# Patient Record
Sex: FEMALE | Race: WHITE | NOT HISPANIC OR LATINO | Employment: UNEMPLOYED | URBAN - NONMETROPOLITAN AREA
[De-identification: names, ages, dates, MRNs, and addresses within clinical notes are randomized per-mention and may not be internally consistent; named-entity substitution may affect disease eponyms.]

---
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
## 2019-03-27 ENCOUNTER — TRANSCRIBE ORDERS (OUTPATIENT)
Dept: ADMINISTRATIVE | Facility: HOSPITAL | Age: 19
End: 2019-03-27

## 2019-03-27 DIAGNOSIS — R10.32 ABDOMINAL PAIN, LEFT LOWER QUADRANT: Primary | ICD-10-CM

## 2019-03-29 ENCOUNTER — HOSPITAL ENCOUNTER (OUTPATIENT)
Dept: CT IMAGING | Facility: HOSPITAL | Age: 19
Discharge: HOME | End: 2019-03-29

## 2019-03-29 DIAGNOSIS — R10.32 ABDOMINAL PAIN, LEFT LOWER QUADRANT: ICD-10-CM

## 2019-04-01 ENCOUNTER — TRANSCRIBE ORDERS (OUTPATIENT)
Dept: ADMINISTRATIVE | Facility: HOSPITAL | Age: 19
End: 2019-04-01

## 2019-04-01 DIAGNOSIS — R10.12 ABDOMINAL PAIN, LEFT UPPER QUADRANT: ICD-10-CM

## 2019-04-01 DIAGNOSIS — R10.32 LEFT LOWER QUADRANT PAIN: Primary | ICD-10-CM

## 2019-04-02 ENCOUNTER — APPOINTMENT (OUTPATIENT)
Dept: CT IMAGING | Facility: HOSPITAL | Age: 19
End: 2019-04-02

## 2019-04-02 ENCOUNTER — HOSPITAL ENCOUNTER (OUTPATIENT)
Dept: CT IMAGING | Facility: HOSPITAL | Age: 19
Discharge: HOME | End: 2019-04-02
Admitting: SURGERY

## 2019-04-02 DIAGNOSIS — R10.12 ABDOMINAL PAIN, LEFT UPPER QUADRANT: ICD-10-CM

## 2019-04-02 DIAGNOSIS — R10.32 LEFT LOWER QUADRANT PAIN: ICD-10-CM

## 2019-04-02 PROCEDURE — 74176 CT ABD & PELVIS W/O CONTRAST: CPT

## 2019-04-02 PROCEDURE — 0 DIATRIZOATE MEGLUMINE & SODIUM PER 1 ML: Performed by: SURGERY

## 2019-04-02 RX ADMIN — DIATRIZOATE MEGLUMINE AND DIATRIZOATE SODIUM 30 ML: 660; 100 LIQUID ORAL; RECTAL at 15:59

## 2020-08-31 ENCOUNTER — HOSPITAL ENCOUNTER (OUTPATIENT)
Dept: ULTRASOUND IMAGING | Facility: HOSPITAL | Age: 20
Discharge: HOME | End: 2020-08-31
Admitting: OBSTETRICS & GYNECOLOGY

## 2020-08-31 DIAGNOSIS — N63.0 LUMP OR MASS IN BREAST: ICD-10-CM

## 2020-08-31 PROCEDURE — 76642 ULTRASOUND BREAST LIMITED: CPT

## 2021-08-11 ENCOUNTER — OFFICE VISIT (OUTPATIENT)
Dept: OTOLARYNGOLOGY | Facility: CLINIC | Age: 21
End: 2021-08-11

## 2021-08-11 VITALS
HEIGHT: 65 IN | BODY MASS INDEX: 35.06 KG/M2 | DIASTOLIC BLOOD PRESSURE: 89 MMHG | SYSTOLIC BLOOD PRESSURE: 133 MMHG | WEIGHT: 210.4 LBS

## 2021-08-11 DIAGNOSIS — J32.9 SINUSITIS, UNSPECIFIED CHRONICITY, UNSPECIFIED LOCATION: ICD-10-CM

## 2021-08-11 DIAGNOSIS — H69.83 DYSFUNCTION OF BOTH EUSTACHIAN TUBES: Primary | ICD-10-CM

## 2021-08-11 DIAGNOSIS — J30.9 ALLERGIC RHINITIS, UNSPECIFIED SEASONALITY, UNSPECIFIED TRIGGER: ICD-10-CM

## 2021-08-11 DIAGNOSIS — J34.2 NASAL SEPTAL DEVIATION: ICD-10-CM

## 2021-08-11 PROBLEM — H69.93 DYSFUNCTION OF BOTH EUSTACHIAN TUBES: Status: ACTIVE | Noted: 2021-08-11

## 2021-08-11 PROCEDURE — 31231 NASAL ENDOSCOPY DX: CPT | Performed by: OTOLARYNGOLOGY

## 2021-08-11 PROCEDURE — 99203 OFFICE O/P NEW LOW 30 MIN: CPT | Performed by: OTOLARYNGOLOGY

## 2021-08-11 RX ORDER — CLINDAMYCIN HYDROCHLORIDE 150 MG/1
100 CAPSULE ORAL 2 TIMES DAILY
COMMUNITY
End: 2022-05-02

## 2021-08-11 RX ORDER — CLINDAMYCIN PHOSPHATE 10 MG/G
GEL TOPICAL
COMMUNITY
Start: 2021-07-26 | End: 2022-05-02

## 2021-08-11 RX ORDER — BENZOYL PEROXIDE 100 MG/ML
LIQUID TOPICAL
COMMUNITY
Start: 2021-07-26 | End: 2022-05-02

## 2021-08-11 NOTE — PROGRESS NOTES
ENT Office Consult Note     Date of Consult: 2021     Patient Name: Marissa Skelton  MRN: [id number]   : 2000     Referring Provider: Ayana Tavares A*    Care Team: Patient Care Team:  Ayana Tavares APRN as PCP - General (Family Medicine)     Chief Complaint:    Chief Complaint   Patient presents with   • Ear Problem     New Patient is here for evaluation due to fluid in ears       History of Present Illness: Marissa Skelton is a 21 y.o. female who presents in consultation today for ears and facial pain.   On going for years. Right > left.   States pop when swallows, intermittent pain, PCP told fluid.   Pain over right eye, in between eyes .     Saw ENT last year. Did 4 weeks bactrim, week steroids, did not help.   Wanted to do allergy test, hearing test, ct, but never got scheduled.     Denies nasal congestion. No nasal discharge. Intermittent soreness inside nose, epistaxis.   Uses saline. No medicated sprays. Previously tried flonase multiple times, did not help.     T/A as kid.     HO Wilm's tumor s/p nephrectomy as chilld.       Subjective      Review of Systems:   Review of Systems   HENT: Positive for congestion, ear pain, nosebleeds, sinus pressure and tinnitus.    Neurological: Positive for headache.      I have reviewed and confirmed the accuracy of the ROS as documented by the MA/LPN/RN Brian Kitchen MD     Pertinent items are noted in HPI.     Past Medical History:   Past Medical History:   Diagnosis Date   • Cancer (CMS/HCC)     kidney   • Migraine    • Nephroblastoma of right kidney (CMS/HCC)     when she was 16 months old   • Seasonal allergies        Past Surgical History:   Past Surgical History:   Procedure Laterality Date   • ADENOIDECTOMY     • CHOLECYSTECTOMY     • KIDNEY SURGERY     • TONSILLECTOMY         Family History: History reviewed. No pertinent family history.    Social History:   Social History     Socioeconomic History   • Marital status:  "Single     Spouse name: Not on file   • Number of children: Not on file   • Years of education: Not on file   • Highest education level: Not on file   Tobacco Use   • Smoking status: Never Smoker   • Smokeless tobacco: Never Used   Vaping Use   • Vaping Use: Never used   Substance and Sexual Activity   • Alcohol use: No   • Drug use: No   • Sexual activity: Defer       Medications:     Current Outpatient Medications:   •  benzoyl peroxide 10 % external wash, , Disp: , Rfl:   •  clindamycin (CLEOCIN) 150 MG capsule, Take 100 mg by mouth 2 (two) times a day., Disp: , Rfl:   •  fluticasone (FLONASE) 50 MCG/ACT nasal spray, 2 sprays into the nostril(s) as directed by provider Daily. 2 puffs each nostril, Disp: 1 bottle, Rfl: 0  •  clindamycin (CLINDAGEL) 1 % gel, , Disp: , Rfl:   •  levocetirizine (XYZAL) 5 MG tablet, Take 5 mg by mouth Every Evening., Disp: , Rfl:   •  Loratadine (CLARITIN) 10 MG capsule, Take 10 mg by mouth Daily As Needed (congestion)., Disp: 30 each, Rfl: 0    Allergies:   Allergies   Allergen Reactions   • Latex Itching       Objective     Physical Exam:  Vital Signs:   Vitals:    08/11/21 1321   BP: 133/89   Weight: 95.4 kg (210 lb 6.4 oz)   Height: 163.8 cm (64.5\")     Body mass index is 35.56 kg/m².     General Appearance:  healthy-appearing, well-nourished, and in no acute distress  Normal voice quality   Head:  Normocephalic, without obvious abnormality, atraumatic   Sinus: No maxillary tenderness   No frontal tenderness    Salivary Glands: No tenderness of the parotid glands or the submandibular glands and no parotid masses or submandibular masses  Normal saliva expressed from glands   Eyes:          Conjunctivae and sclerae normal, EOMI   Ear -  Left: EAC clear  TM WNL, no perfs, no effusion    Ear - Right:  EAC clear  TM WNL, no perfs, no effusion    Nose: Septum mild left deviation, no lesions   IT normal bilaterally   No mucosal inflammation or edema   No drainage    Mouth: Lips " WNL  MMM  No buccal lesions   Tongue, FOM WNL, no lesions, soft to palpation  Tonsils absent  No palatal lesions   OP clear, no erythema or exudates   TMJ: No TMJ tenderness, no popping or cracking, symmetric opening   Neck: symmetrical and trachea midline  No thyroid nodules, no thyromegaly    Lungs:   Clear to auscultation, respirations regular, jesusita and unlabored  No inc WOB    Heart: Regular rhythm and normal rate   Skin: Warm   Lymph nodes: No palpable cervical adenopathy   Neurologic: Cranial nerves 2 - 12 grossly intact     Alert and oriented   Appropriate mood and affect        Procedure:   Procedures  Procedure: Nasal Endoscopy    After patient written consent and topicalization, the endoscope was inserted atraumatically into the bilateral nasal cavities. Septum is mild deviated to left. Turbinates are not significantly hypertrophied . Scant  secretions. No masses or concerning lesions visualized in the nasal cavities or nasopharynx. The patient tolerated the procedure well.      Assessment / Plan      Assessment/Plan:   Diagnoses and all orders for this visit:    1. Dysfunction of both eustachian tubes (Primary)  -     Ambulatory Referral to Audiology  -     Ambulatory Referral to Allergy    2. Allergic rhinitis, unspecified seasonality, unspecified trigger  -     Ambulatory Referral to Allergy    3. Sinusitis, unspecified chronicity, unspecified location  -     CT Sinus Without Contrast; Future    4. Nasal septal deviation       21-year-old female seen today for popping of her ears, pain above her right eye, and pain between her eyes.  She is done numerous rounds of antibiotics including a month-long course within the last year and she has never seen any change in her symptoms.  She has been told she has fluid on ears but she has a reassuring ear exam today.  I would like to set her up for couple things.  First a hearing test.  I also like to get a sinus CT to better evaluate her sinuses.  Reassuring  nasal endoscopy today.  Would also like her to get allergy tested.  Is on an antihistamine and has Flonase at home to use.  Also can continue her nasal saline.  We will plan to see back with her sinus scan results.    Follow Up:   Return in about 1 month (around 9/11/2021) for Radiology before next visit.    Brian Kitchen MD

## 2021-08-23 ENCOUNTER — APPOINTMENT (OUTPATIENT)
Dept: CT IMAGING | Facility: HOSPITAL | Age: 21
End: 2021-08-23

## 2021-08-30 ENCOUNTER — HOSPITAL ENCOUNTER (OUTPATIENT)
Dept: CT IMAGING | Facility: HOSPITAL | Age: 21
Discharge: HOME | End: 2021-08-30
Admitting: OTOLARYNGOLOGY

## 2021-08-30 DIAGNOSIS — J32.9 SINUSITIS, UNSPECIFIED CHRONICITY, UNSPECIFIED LOCATION: ICD-10-CM

## 2021-08-30 PROCEDURE — 70486 CT MAXILLOFACIAL W/O DYE: CPT

## 2021-09-01 ENCOUNTER — TELEPHONE (OUTPATIENT)
Dept: OTOLARYNGOLOGY | Facility: CLINIC | Age: 21
End: 2021-09-01

## 2021-09-01 NOTE — TELEPHONE ENCOUNTER
Hearing test reassuring   Sinus CT shows likely allergic changes   No fluid on ears on CT     Please schedule a f/u appointment to review sinus CT and discuss next steps

## 2022-02-09 ENCOUNTER — OFFICE VISIT (OUTPATIENT)
Dept: OTOLARYNGOLOGY | Facility: CLINIC | Age: 22
End: 2022-02-09

## 2022-02-09 VITALS
DIASTOLIC BLOOD PRESSURE: 72 MMHG | HEIGHT: 65 IN | WEIGHT: 203 LBS | SYSTOLIC BLOOD PRESSURE: 114 MMHG | BODY MASS INDEX: 33.82 KG/M2

## 2022-02-09 DIAGNOSIS — J32.9 SINUSITIS, UNSPECIFIED CHRONICITY, UNSPECIFIED LOCATION: ICD-10-CM

## 2022-02-09 DIAGNOSIS — J30.9 ALLERGIC RHINITIS, UNSPECIFIED SEASONALITY, UNSPECIFIED TRIGGER: Primary | ICD-10-CM

## 2022-02-09 DIAGNOSIS — J34.3 NASAL TURBINATE HYPERTROPHY: ICD-10-CM

## 2022-02-09 DIAGNOSIS — H69.83 DYSFUNCTION OF BOTH EUSTACHIAN TUBES: ICD-10-CM

## 2022-02-09 PROCEDURE — 99214 OFFICE O/P EST MOD 30 MIN: CPT | Performed by: OTOLARYNGOLOGY

## 2022-02-09 RX ORDER — ISOTRETINOIN 40 MG/1
CAPSULE ORAL
COMMUNITY
Start: 2022-01-12 | End: 2022-05-02

## 2022-02-09 NOTE — PROGRESS NOTES
"       ENT Office Follow Up Note     Date of Consult: 2022     Patient Name: Marissa Skelton  MRN: [id number]   : 2000     Referring Provider: No ref. provider found    Care Team: Patient Care Team:  Ayana Tavares APRN as PCP - General (Family Medicine)     Chief Complaint:    Chief Complaint   Patient presents with   • Follow-up     pt in office for follow up on CT scan and dysfunction of both eustachian tubes       History of Present Illness: Marissa Skelton is a 21 y.o. female who presents to clinic today for follow up of her sinus CT. she overall states that symptoms have improved some this winter.  She did see an allergist who tested her did recommend shots.  She does not remember exactly what she is allergic to other than Sg grass.  She is not taking antihistamines currently as it dries her sinuses out and she is also having a side effect from Accutane.  She is using the Flonase and tolerating that okay.      Subjective      Review of Systems:   Review of Systems   HENT: Positive for ear pain.       I have reviewed and confirmed the accuracy of the ROS as documented by the MA/LPN/RN Brian Kitchen MD     Pertinent items are noted in HPI.     The following portions of the patient's history were reviewed and updated as appropriate: allergies, current medications, past family history, past medical history, past social history, past surgical history and problem list.    Allergies:   Allergies   Allergen Reactions   • Latex Itching       Objective     Physical Exam:  Vital Signs:   Vitals:    22 1105   BP: 114/72   Weight: 92.1 kg (203 lb)   Height: 163.8 cm (64.5\")   PainSc:   2   PainLoc: Ear     Body mass index is 34.31 kg/m².     General Appearance:  healthy-appearing, well-nourished, and in no acute distress  Normal voice quality   Head:  Normocephalic, without obvious abnormality, atraumatic   Sinus: No maxillary tenderness   No frontal tenderness    Salivary Glands: " No tenderness of the parotid glands or the submandibular glands and no parotid masses or submandibular masses  Normal saliva expressed from glands   Eyes:          Conjunctivae and sclerae normal, EOMI   Ear -  Left: EAC clear  TM WNL, no perfs, no effusion    Ear - Right:  EAC clear  TM WNL, no perfs, no effusion    Nose: Septum relatively straight, no lesions   IT hypertrophy bilaterally   No mucosal inflammation or edema   No drainage    Mouth: Lips WNL  MMM  No buccal lesions   Tongue, FOM WNL, no lesions, soft to palpation  Tonsils normal   No palatal lesions   OP clear, no erythema or exudates   Neck: symmetrical and trachea midline  No thyroid nodules, no thyromegaly    Lungs:   No inc WOB    Heart: Regular  rate   Skin: Warm   Lymph nodes: No palpable cervical adenopathy   Neurologic: Cranial nerves 2 - 12 grossly intact     Alert and oriented   Appropriate mood and affect        Procedure:   Procedures        Assessment / Plan      Assessment/Plan:   Diagnoses and all orders for this visit:    1. Allergic rhinitis, unspecified seasonality, unspecified trigger (Primary)    2. Sinusitis, unspecified chronicity, unspecified location  -     Case Request; Standing  -     Case Request    3. Dysfunction of both eustachian tubes  -     Case Request; Standing  -     Case Request    4. Nasal turbinate hypertrophy       2/9/22 Visit:  Audiogram - mild high frequency conductive loss, though overall reassuring. Possible ETD contributing. Will recheck in about a year.   Allergy testing - positive, will try to get copy of test results, she notes javier grass, rec'd shots, she has not started   Sinus CT - reviewed, bilateral maxillary inflammation, obstructed OMC's     21-year-old follow-up on her allergies, eustachian tube dysfunction, and sinuses.  She states her sinuses have felt better but she still has popping and cracking in her ears.  Audio she had a mild conductive loss in the high frequencies on the left,  possibly related to her eustachian tube dysfunction.  Otherwise reassuring exam today.  Plan to recheck her hearing in about a year.  On her sinus CT she does have significant inflammation in her maxillary sinuses with obstructed OMCs.  This despite previous rounds of antibiotics and Flonase she is.  We discussed proceeding with bilateral maxillary enterostomies, bilateral anterior ethmoidectomies, bilateral inferior turbinate reduction, and bilateral eustachian tube balloon dilation.  She would like to go ahead and proceed with this.  We will work on getting set up.  We will try to get a copy of her outside allergy records to review as well.    Full risks and benefits of operative versus non-operative intervention have been thoroughly discussed. Risks including but not limited to bleeding, infection, nerve injury, deformity, septal perforation, CSF leak, orbital injury, persistent post operative symptoms, anesthesia complications were all discussed. Understanding was expressed and we will proceed with the operative treatment plan. There were no questions for me at the end of the office visit.      Follow Up:   No follow-ups on file.    Brian Kitchen MD

## 2022-02-28 ENCOUNTER — APPOINTMENT (OUTPATIENT)
Dept: PREADMISSION TESTING | Facility: HOSPITAL | Age: 22
End: 2022-02-28

## 2022-04-29 ENCOUNTER — TELEPHONE (OUTPATIENT)
Dept: PREADMISSION TESTING | Facility: HOSPITAL | Age: 22
End: 2022-04-29

## 2022-05-02 ENCOUNTER — PRE-ADMISSION TESTING (OUTPATIENT)
Dept: PREADMISSION TESTING | Facility: HOSPITAL | Age: 22
End: 2022-05-02

## 2022-05-02 VITALS — BODY MASS INDEX: 35.34 KG/M2 | HEIGHT: 64 IN | WEIGHT: 207 LBS

## 2022-05-02 LAB
ANION GAP SERPL CALCULATED.3IONS-SCNC: 10 MMOL/L (ref 5–15)
BUN SERPL-MCNC: 9 MG/DL (ref 6–20)
BUN/CREAT SERPL: 11 (ref 7–25)
CALCIUM SPEC-SCNC: 9.6 MG/DL (ref 8.6–10.5)
CHLORIDE SERPL-SCNC: 107 MMOL/L (ref 98–107)
CO2 SERPL-SCNC: 22 MMOL/L (ref 22–29)
CREAT SERPL-MCNC: 0.82 MG/DL (ref 0.57–1)
DEPRECATED RDW RBC AUTO: 39.3 FL (ref 37–54)
EGFRCR SERPLBLD CKD-EPI 2021: 103.9 ML/MIN/1.73
ERYTHROCYTE [DISTWIDTH] IN BLOOD BY AUTOMATED COUNT: 13.3 % (ref 12.3–15.4)
GLUCOSE SERPL-MCNC: 93 MG/DL (ref 65–99)
HCT VFR BLD AUTO: 39.7 % (ref 34–46.6)
HGB BLD-MCNC: 13.3 G/DL (ref 12–15.9)
MCH RBC QN AUTO: 27.2 PG (ref 26.6–33)
MCHC RBC AUTO-ENTMCNC: 33.5 G/DL (ref 31.5–35.7)
MCV RBC AUTO: 81.2 FL (ref 79–97)
PLATELET # BLD AUTO: 249 10*3/MM3 (ref 140–450)
PMV BLD AUTO: 10.5 FL (ref 6–12)
POTASSIUM SERPL-SCNC: 3.7 MMOL/L (ref 3.5–5.2)
RBC # BLD AUTO: 4.89 10*6/MM3 (ref 3.77–5.28)
SODIUM SERPL-SCNC: 139 MMOL/L (ref 136–145)
WBC NRBC COR # BLD: 7.42 10*3/MM3 (ref 3.4–10.8)

## 2022-05-02 PROCEDURE — 85027 COMPLETE CBC AUTOMATED: CPT

## 2022-05-02 PROCEDURE — C9803 HOPD COVID-19 SPEC COLLECT: HCPCS

## 2022-05-02 PROCEDURE — U0004 COV-19 TEST NON-CDC HGH THRU: HCPCS

## 2022-05-02 PROCEDURE — 36415 COLL VENOUS BLD VENIPUNCTURE: CPT

## 2022-05-02 PROCEDURE — 80048 BASIC METABOLIC PNL TOTAL CA: CPT

## 2022-05-03 LAB — SARS-COV-2 RNA PNL SPEC NAA+PROBE: NOT DETECTED

## 2022-05-04 ENCOUNTER — HOSPITAL ENCOUNTER (OUTPATIENT)
Facility: HOSPITAL | Age: 22
Setting detail: HOSPITAL OUTPATIENT SURGERY
Discharge: HOME | End: 2022-05-04
Attending: OTOLARYNGOLOGY | Admitting: OTOLARYNGOLOGY

## 2022-05-04 ENCOUNTER — ANESTHESIA (OUTPATIENT)
Dept: PERIOP | Facility: HOSPITAL | Age: 22
End: 2022-05-04

## 2022-05-04 ENCOUNTER — ANESTHESIA EVENT (OUTPATIENT)
Dept: PERIOP | Facility: HOSPITAL | Age: 22
End: 2022-05-04

## 2022-05-04 VITALS
HEART RATE: 72 BPM | RESPIRATION RATE: 20 BRPM | SYSTOLIC BLOOD PRESSURE: 138 MMHG | OXYGEN SATURATION: 97 % | TEMPERATURE: 97.7 F | DIASTOLIC BLOOD PRESSURE: 70 MMHG

## 2022-05-04 DIAGNOSIS — J32.9 SINUSITIS, UNSPECIFIED CHRONICITY, UNSPECIFIED LOCATION: Primary | ICD-10-CM

## 2022-05-04 LAB
B-HCG UR QL: NEGATIVE
EXPIRATION DATE: NORMAL
INTERNAL NEGATIVE CONTROL: NORMAL
INTERNAL POSITIVE CONTROL: NORMAL
Lab: NORMAL

## 2022-05-04 PROCEDURE — 69706 NPS SURG DILAT EUST TUBE BI: CPT | Performed by: OTOLARYNGOLOGY

## 2022-05-04 PROCEDURE — 25010000002 FENTANYL CITRATE (PF) 100 MCG/2ML SOLUTION: Performed by: NURSE ANESTHETIST, CERTIFIED REGISTERED

## 2022-05-04 PROCEDURE — 25010000002 DEXAMETHASONE PER 1 MG: Performed by: NURSE ANESTHETIST, CERTIFIED REGISTERED

## 2022-05-04 PROCEDURE — 25010000002 ONDANSETRON PER 1 MG: Performed by: NURSE ANESTHETIST, CERTIFIED REGISTERED

## 2022-05-04 PROCEDURE — C1726 CATH, BAL DIL, NON-VASCULAR: HCPCS | Performed by: OTOLARYNGOLOGY

## 2022-05-04 PROCEDURE — 25010000002 PROPOFOL 200 MG/20ML EMULSION: Performed by: NURSE ANESTHETIST, CERTIFIED REGISTERED

## 2022-05-04 PROCEDURE — 31254 NSL/SINS NDSC W/PRTL ETHMDCT: CPT | Performed by: OTOLARYNGOLOGY

## 2022-05-04 PROCEDURE — 0 CEFAZOLIN SODIUM-DEXTROSE 2-3 GM-%(50ML) RECONSTITUTED SOLUTION: Performed by: OTOLARYNGOLOGY

## 2022-05-04 PROCEDURE — 31256 EXPLORATION MAXILLARY SINUS: CPT | Performed by: OTOLARYNGOLOGY

## 2022-05-04 PROCEDURE — 30140 RESECT INFERIOR TURBINATE: CPT | Performed by: OTOLARYNGOLOGY

## 2022-05-04 PROCEDURE — 25010000002 HYDROMORPHONE 1 MG/ML SOLUTION: Performed by: NURSE ANESTHETIST, CERTIFIED REGISTERED

## 2022-05-04 PROCEDURE — 25010000002 SUCCINYLCHOLINE PER 20 MG: Performed by: NURSE ANESTHETIST, CERTIFIED REGISTERED

## 2022-05-04 PROCEDURE — 81025 URINE PREGNANCY TEST: CPT | Performed by: OTOLARYNGOLOGY

## 2022-05-04 RX ORDER — MAGNESIUM HYDROXIDE 1200 MG/15ML
LIQUID ORAL AS NEEDED
Status: DISCONTINUED | OUTPATIENT
Start: 2022-05-04 | End: 2022-05-04 | Stop reason: HOSPADM

## 2022-05-04 RX ORDER — OXYMETAZOLINE HYDROCHLORIDE 0.05 G/100ML
SPRAY NASAL AS NEEDED
Status: DISCONTINUED | OUTPATIENT
Start: 2022-05-04 | End: 2022-05-04 | Stop reason: HOSPADM

## 2022-05-04 RX ORDER — CEFAZOLIN SODIUM 2 G/50ML
2 SOLUTION INTRAVENOUS ONCE
Status: COMPLETED | OUTPATIENT
Start: 2022-05-04 | End: 2022-05-04

## 2022-05-04 RX ORDER — PROPOFOL 10 MG/ML
INJECTION, EMULSION INTRAVENOUS AS NEEDED
Status: DISCONTINUED | OUTPATIENT
Start: 2022-05-04 | End: 2022-05-04 | Stop reason: SURG

## 2022-05-04 RX ORDER — ONDANSETRON 2 MG/ML
INJECTION INTRAMUSCULAR; INTRAVENOUS AS NEEDED
Status: DISCONTINUED | OUTPATIENT
Start: 2022-05-04 | End: 2022-05-04 | Stop reason: SURG

## 2022-05-04 RX ORDER — ROCURONIUM BROMIDE 10 MG/ML
INJECTION, SOLUTION INTRAVENOUS AS NEEDED
Status: DISCONTINUED | OUTPATIENT
Start: 2022-05-04 | End: 2022-05-04 | Stop reason: SURG

## 2022-05-04 RX ORDER — NITROGLYCERIN 20 MG/100ML
INJECTION INTRAVENOUS AS NEEDED
Status: DISCONTINUED | OUTPATIENT
Start: 2022-05-04 | End: 2022-05-04 | Stop reason: SURG

## 2022-05-04 RX ORDER — NEOSTIGMINE METHYLSULFATE 5 MG/5 ML
SYRINGE (ML) INTRAVENOUS AS NEEDED
Status: DISCONTINUED | OUTPATIENT
Start: 2022-05-04 | End: 2022-05-04 | Stop reason: SURG

## 2022-05-04 RX ORDER — ONDANSETRON 4 MG/1
4 TABLET, ORALLY DISINTEGRATING ORAL EVERY 8 HOURS PRN
Qty: 10 TABLET | Refills: 1 | Status: SHIPPED | OUTPATIENT
Start: 2022-05-04

## 2022-05-04 RX ORDER — DEXAMETHASONE SODIUM PHOSPHATE 4 MG/ML
INJECTION, SOLUTION INTRA-ARTICULAR; INTRALESIONAL; INTRAMUSCULAR; INTRAVENOUS; SOFT TISSUE AS NEEDED
Status: DISCONTINUED | OUTPATIENT
Start: 2022-05-04 | End: 2022-05-04 | Stop reason: SURG

## 2022-05-04 RX ORDER — HYDROCODONE BITARTRATE AND ACETAMINOPHEN 7.5; 325 MG/1; MG/1
1 TABLET ORAL EVERY 6 HOURS PRN
Qty: 20 TABLET | Refills: 0 | Status: SHIPPED | OUTPATIENT
Start: 2022-05-04

## 2022-05-04 RX ORDER — SODIUM CHLORIDE 9 MG/ML
INJECTION, SOLUTION INTRAVENOUS AS NEEDED
Status: DISCONTINUED | OUTPATIENT
Start: 2022-05-04 | End: 2022-05-04 | Stop reason: HOSPADM

## 2022-05-04 RX ORDER — LABETALOL HYDROCHLORIDE 5 MG/ML
INJECTION, SOLUTION INTRAVENOUS AS NEEDED
Status: DISCONTINUED | OUTPATIENT
Start: 2022-05-04 | End: 2022-05-04 | Stop reason: SURG

## 2022-05-04 RX ORDER — LIDOCAINE HYDROCHLORIDE AND EPINEPHRINE 10; 10 MG/ML; UG/ML
INJECTION, SOLUTION INFILTRATION; PERINEURAL AS NEEDED
Status: DISCONTINUED | OUTPATIENT
Start: 2022-05-04 | End: 2022-05-04 | Stop reason: HOSPADM

## 2022-05-04 RX ORDER — MORPHINE SULFATE 2 MG/ML
2 INJECTION, SOLUTION INTRAMUSCULAR; INTRAVENOUS
Status: DISCONTINUED | OUTPATIENT
Start: 2022-05-04 | End: 2022-05-04 | Stop reason: HOSPADM

## 2022-05-04 RX ORDER — METHYLPREDNISOLONE 4 MG/1
TABLET ORAL
Qty: 21 EACH | Refills: 0 | Status: SHIPPED | OUTPATIENT
Start: 2022-05-04

## 2022-05-04 RX ORDER — SODIUM CHLORIDE, SODIUM LACTATE, POTASSIUM CHLORIDE, CALCIUM CHLORIDE 600; 310; 30; 20 MG/100ML; MG/100ML; MG/100ML; MG/100ML
1000 INJECTION, SOLUTION INTRAVENOUS CONTINUOUS
Status: DISCONTINUED | OUTPATIENT
Start: 2022-05-04 | End: 2022-05-04 | Stop reason: HOSPADM

## 2022-05-04 RX ORDER — AMOXICILLIN AND CLAVULANATE POTASSIUM 875; 125 MG/1; MG/1
1 TABLET, FILM COATED ORAL 2 TIMES DAILY
Qty: 20 TABLET | Refills: 0 | Status: SHIPPED | OUTPATIENT
Start: 2022-05-04 | End: 2022-05-14

## 2022-05-04 RX ORDER — ONDANSETRON 2 MG/ML
4 INJECTION INTRAMUSCULAR; INTRAVENOUS ONCE
Status: DISCONTINUED | OUTPATIENT
Start: 2022-05-04 | End: 2022-05-04 | Stop reason: HOSPADM

## 2022-05-04 RX ORDER — ONDANSETRON 2 MG/ML
4 INJECTION INTRAMUSCULAR; INTRAVENOUS ONCE AS NEEDED
Status: COMPLETED | OUTPATIENT
Start: 2022-05-04 | End: 2022-05-04

## 2022-05-04 RX ORDER — SUCCINYLCHOLINE CHLORIDE 20 MG/ML
INJECTION INTRAMUSCULAR; INTRAVENOUS AS NEEDED
Status: DISCONTINUED | OUTPATIENT
Start: 2022-05-04 | End: 2022-05-04 | Stop reason: SURG

## 2022-05-04 RX ORDER — LIDOCAINE HYDROCHLORIDE 20 MG/ML
INJECTION, SOLUTION INTRAVENOUS AS NEEDED
Status: DISCONTINUED | OUTPATIENT
Start: 2022-05-04 | End: 2022-05-04 | Stop reason: SURG

## 2022-05-04 RX ORDER — MEPERIDINE HYDROCHLORIDE 25 MG/ML
25 INJECTION INTRAMUSCULAR; INTRAVENOUS; SUBCUTANEOUS ONCE AS NEEDED
Status: DISCONTINUED | OUTPATIENT
Start: 2022-05-04 | End: 2022-05-04 | Stop reason: HOSPADM

## 2022-05-04 RX ORDER — FENTANYL CITRATE 50 UG/ML
INJECTION, SOLUTION INTRAMUSCULAR; INTRAVENOUS AS NEEDED
Status: DISCONTINUED | OUTPATIENT
Start: 2022-05-04 | End: 2022-05-04 | Stop reason: SURG

## 2022-05-04 RX ADMIN — NITROGLYCERIN 100 MCG: 20 INJECTION INTRAVENOUS at 11:27

## 2022-05-04 RX ADMIN — ONDANSETRON 4 MG: 2 INJECTION INTRAMUSCULAR; INTRAVENOUS at 13:44

## 2022-05-04 RX ADMIN — FENTANYL CITRATE 100 MCG: 50 INJECTION INTRAMUSCULAR; INTRAVENOUS at 10:26

## 2022-05-04 RX ADMIN — ONDANSETRON 4 MG: 2 INJECTION INTRAMUSCULAR; INTRAVENOUS at 10:24

## 2022-05-04 RX ADMIN — DEXAMETHASONE SODIUM PHOSPHATE 4 MG: 4 INJECTION, SOLUTION INTRAMUSCULAR; INTRAVENOUS at 10:24

## 2022-05-04 RX ADMIN — PROPOFOL 200 MG: 10 INJECTION, EMULSION INTRAVENOUS at 10:24

## 2022-05-04 RX ADMIN — SUCCINYLCHOLINE CHLORIDE 100 MG: 20 INJECTION, SOLUTION INTRAMUSCULAR; INTRAVENOUS at 10:24

## 2022-05-04 RX ADMIN — HYDROMORPHONE HYDROCHLORIDE 0.5 MG: 1 INJECTION, SOLUTION INTRAMUSCULAR; INTRAVENOUS; SUBCUTANEOUS at 12:10

## 2022-05-04 RX ADMIN — LABETALOL HYDROCHLORIDE 10 MG: 5 INJECTION, SOLUTION INTRAVENOUS at 11:27

## 2022-05-04 RX ADMIN — GLYCOPYRROLATE 0.8 MG: 0.2 INJECTION, SOLUTION INTRAMUSCULAR; INTRAVENOUS at 11:54

## 2022-05-04 RX ADMIN — ROCURONIUM BROMIDE 40 MG: 10 INJECTION INTRAVENOUS at 10:24

## 2022-05-04 RX ADMIN — SODIUM CHLORIDE, POTASSIUM CHLORIDE, SODIUM LACTATE AND CALCIUM CHLORIDE 1000 ML: 600; 310; 30; 20 INJECTION, SOLUTION INTRAVENOUS at 08:03

## 2022-05-04 RX ADMIN — CEFAZOLIN SODIUM 2 G: 2 SOLUTION INTRAVENOUS at 10:24

## 2022-05-04 RX ADMIN — Medication 5 MG: at 11:54

## 2022-05-04 RX ADMIN — LIDOCAINE HYDROCHLORIDE 60 MG: 20 INJECTION, SOLUTION INTRAVENOUS at 10:24

## 2022-05-04 NOTE — OP NOTE
ENT Operative Note       Procedure: FUNCTIONAL ENDOSCOPIC SINUS SURGERY WITH MAXILLARY ANTROSTOMY    Patient Name: Marissa Skelton  MRN: [id number]   : 2000     Date of Surgery: 2022    Referring Provider: Brian Kitchen MD    Care Team: Patient Care Team:  Ayana Tavares APRN as PCP - General (Family Medicine)     Pre-Operative Diagnosis:   Sinusitis, unspecified chronicity, unspecified location [J32.9]  Dysfunction of both eustachian tubes [H69.83]    Post-Operative Diagnosis:   Post-Op Diagnosis Codes:     * Sinusitis, unspecified chronicity, unspecified location [J32.9]     * Dysfunction of both eustachian tubes [H69.83]    Procedure/CPT® Codes:      Procedure(s):  FUNCTIONAL ENDOSCOPIC SINUS SURGERY WITH MAXILLARY ANTROSTOMY BILATERAL, BILATERAL ANTERIOR ETHMOIDECTOMIES, BILATERAL INFERIOR TURBINATE REDUCTION, BILATERAL EUSTACHIAN TUBE BALLOON DILATION    Performing Surgeon:   Surgeon(s):  Brian Kitchen MD    Assistants/Staff: none    Anesthesia: General    Estimated Blood Loss: 25 cc    Implants:   Nothing was implanted during the procedure    Specimen:   none    Findings:   Bilateral inferior turbinate hypertrophy   Narrow OMC bilaterally     Complications: none    Description of Procedure:   Patient was brought to the OR, laid supine position, general anesthesia was induced. Prepped and draped in the usual fashion.  Her nares were decongested with Afrin-soaked pledgets.  For started with the eustachian tube dilation.  Eustachian tube balloon was gently inserted first into the right eustachian tube atraumatically and then dilated for 2 minutes.  I did then a second additional dilation on the right again for another 2 minutes.  Same procedure was then performed on the left side.  The medium the sinonasal portion the procedure.  First starting on the right her inferior turbinate was lateralized middle turbinate medialized.  Identified the maxillary sinus os with a  ball-tipped probe and the uncinate was reflected forward.  Is taken down with the backbiter and microdebrider.  We then performed a limited anterior ethmoidectomy to further open up her ostiomeatal complex.  I then performed the same procedure on the left side where again the maxillary sinus os was palpated with a ball-tipped probe and the uncinate reflected forward.  Uncinate is taken down the backbiter and microdebrider and a limited anterior ethmoidectomy again performed to further open up her ostiomeatal complex.  Patient had very narrow nasal passages and narrowed ostiomeatal complexes bilaterally.  Finally performed the inferior turbinate reduction.  Stab incision was made to head of each inferior turbinate.  Mucosa was dissected off the underlying bone.  Submucosal resection was then performed bilaterally.  Nose mouth were then suctioned out.  Xerogel was placed into the bilateral middle meatus.  She is then turned back over to anesthesia to be awoken.      Brian Kitchen MD  Date: 5/4/2022  Time: 10:04 EDT

## 2022-05-04 NOTE — H&P
ENT Office Consult Note     Date of Consult: 2022     Patient Name: Marissa Skelton  MRN: [id number]   : 2000     Referring Provider: Brian Kitchen MD    Care Team: Patient Care Team:  Ayana Tavares APRN as PCP - General (Family Medicine)     Chief Complaint: ears, nose     History of Present Illness: Marissa Skelton is a 21 y.o. female who presents today for surgery. No changes to health since last clinic visit      Subjective      Review of Systems:   Review of Systems   I have reviewed and confirmed the accuracy of the ROS as documented by the MA/LPN/RN Brian Kitchen MD     Pertinent items are noted in HPI.     Past Medical History:   Past Medical History:   Diagnosis Date   • Cancer (HCC)     kidney   • COVID-19 vaccine series completed     WITH BOOSTER   • Migraine    • Nephroblastoma of right kidney (HCC)     when she was 16 months old   • Piercing     EARS   • Seasonal allergies    • Wears glasses        Past Surgical History:   Past Surgical History:   Procedure Laterality Date   • ADENOIDECTOMY     • CHOLECYSTECTOMY     • ENDOSCOPY     • NEPHRECTOMY Right    • TONSILLECTOMY     • WISDOM TOOTH EXTRACTION         Family History: History reviewed. No pertinent family history.    Social History:   Social History     Socioeconomic History   • Marital status: Single   Tobacco Use   • Smoking status: Never Smoker   • Smokeless tobacco: Never Used   Vaping Use   • Vaping Use: Never used   Substance and Sexual Activity   • Alcohol use: No   • Drug use: No   • Sexual activity: Defer       Medications:     Current Facility-Administered Medications:   •  ceFAZolin Sodium-Dextrose (ANCEF) IVPB (duplex) 2 g, 2 g, Intravenous, Once, Brian Kitchen MD  •  lactated ringers infusion 1,000 mL, 1,000 mL, Intravenous, Continuous, Brian Kitchen MD, Last Rate: 25 mL/hr at 22 0803, 1,000 mL at 22 0803    Allergies:   Allergies   Allergen Reactions   • Latex  Itching       Objective     Physical Exam:  Vital Signs:   Vitals:    05/04/22 0742 05/04/22 0825   BP: (!) 164/105 158/88   BP Location: Left arm Right arm   Patient Position: Sitting Sitting   Pulse: 115 104   Resp: 18    Temp: 98.8 °F (37.1 °C)    TempSrc: Temporal    SpO2: 100% 100%     There is no height or weight on file to calculate BMI.       General Appearance:  healthy-appearing, well-nourished, and in no acute distress  Normal voice quality   Head:  Normocephalic, without obvious abnormality, atraumatic       Salivary Glands: No tenderness of the parotid glands or the submandibular glands and no parotid masses or submandibular masses  Normal saliva expressed from glands   Eyes:          Conjunctivae and sclerae normal, EOMI   Ear -  Left: External ear WNL   Ear - Right:  External ear WNL    Nose: Septum relatively straight, no lesions   IT hypertrophy bilaterally   No mucosal inflammation or edema   No drainage    Mouth: Lips WNL  MMM   Neck: symmetrical and trachea midline  No thyroid nodules, no thyromegaly    Lungs:   No inc WOB    Heart: Regular  rate   Skin: Warm   Lymph nodes: No palpable cervical adenopathy   Neurologic: Appropriate mood and affect           Procedure:   Procedures      Assessment / Plan      Assessment/Plan:   1. Allergic rhinitis, unspecified seasonality, unspecified trigger (Primary)     2. Sinusitis, unspecified chronicity, unspecified location  -     Case Request; Standing  -     Case Request     3. Dysfunction of both eustachian tubes  -     Case Request; Standing  -     Case Request     4. Nasal turbinate hypertrophy         21-year-old follow-up on her allergies, eustachian tube dysfunction, and sinuses.  She states her sinuses have felt better but she still has popping and cracking in her ears.  Audio she had a mild conductive loss in the high frequencies on the left, possibly related to her eustachian tube dysfunction.  Plan to recheck her hearing in about a year.  On her  sinus CT she does have significant inflammation in her maxillary sinuses with obstructed OMCs.  This is despite previous rounds of antibiotics and Flonase .  We discussed proceeding with bilateral maxillary enterostomies, bilateral anterior ethmoidectomies, bilateral inferior turbinate reduction, and bilateral eustachian tube balloon dilation.     Full risks and benefits of operative versus non-operative intervention have been thoroughly discussed. Risks including but not limited to bleeding, infection, nerve injury, deformity, septal perforation, CSF leak, orbital injury, persistent post operative symptoms, anesthesia complications were all discussed. Understanding was expressed and we will proceed with the operative treatment plan. There were no questions for me at the end of the office visit.      Follow Up:   No follow-ups on file.    Brian Kitchen MD

## 2022-05-04 NOTE — ANESTHESIA POSTPROCEDURE EVALUATION
Patient: Marissa Skelton    Procedure Summary     Date: 05/04/22 Room / Location: [facility] OR  /  [facility]    Anesthesia Start: 1017 Anesthesia Stop: 1205    Procedure: FUNCTIONAL ENDOSCOPIC SINUS SURGERY WITH MAXILLARY ANTROSTOMY BILATERAL, BILATERAL ANTERIOR ETHMOIDECTOMIES, BILATERAL INFERIOR TURBINATE REDUCTION, BILATERAL EUSTACHIAN TUBE BALLOON DILATION (Bilateral ) Diagnosis:       Sinusitis, unspecified chronicity, unspecified location      Dysfunction of both eustachian tubes      (Sinusitis, unspecified chronicity, unspecified location [J32.9])      (Dysfunction of both eustachian tubes [H69.83])    Surgeons: Brian Kitchen MD Provider: Remy Ivy CRNA    Anesthesia Type: general ASA Status: 2          Anesthesia Type: general    Vitals  Vitals Value Taken Time   /106 05/04/22 1205   Temp     Pulse 107 05/04/22 1205   Resp     SpO2 100 % 05/04/22 1205   Vitals shown include unvalidated device data.      Vitals per nursing documentaion  Post Anesthesia Care and Evaluation    Patient location during evaluation: PACU  Patient participation: complete - patient participated  Level of consciousness: awake  Pain score: 3  Pain management: adequate  Airway patency: patent  Anesthetic complications: No anesthetic complications  PONV Status: controlled  Cardiovascular status: acceptable and stable  Respiratory status: acceptable and face mask  Hydration status: acceptable

## 2022-05-04 NOTE — DISCHARGE INSTRUCTIONS
No pushing, pulling, tugging,  heavy lifting, or strenuous activity.  No major decision making, driving, or drinking alcoholic beverages for 24 hours. ( due to the medications you have  received)  Always use good hand hygiene/washing techniques.  NO driving while taking pain medications.    To assist you in voiding:  Drink plenty of fluids  Listen to running water while attempting to void.    If you are unable to urinate and you have an uncomfortable urge to void or it has been   6 hours since you were discharged, return to the Emergency Room.    ENT instructions given as directed per Dr. Brian Kitchen from chart.  Copy of instructions also placed in pt.'s chart.

## 2022-05-04 NOTE — ANESTHESIA PREPROCEDURE EVALUATION
Anesthesia Evaluation     Patient summary reviewed and Nursing notes reviewed   no history of anesthetic complications:  NPO Solid Status: > 8 hours  NPO Liquid Status: > 8 hours           Airway   Mallampati: II  TM distance: >3 FB  Neck ROM: full  no difficulty expected  Dental - normal exam     Pulmonary - negative pulmonary ROS and normal exam   Cardiovascular - negative cardio ROS and normal exam  Exercise tolerance: excellent (>7 METS)        Neuro/Psych  (+) headaches (migraines ),    GI/Hepatic/Renal/Endo    (+)   renal disease (s/p nephrectomy for renal CA),     ROS Comment: Hx of nephrectomy as child    Musculoskeletal (-) negative ROS    Abdominal    Substance History - negative use     OB/GYN negative ob/gyn ROS     Comment: Urine pregnancy negative       Other - negative ROS       ROS/Med Hx Other: K 3.7                Anesthesia Plan    ASA 2     general   (Risks and benefits discussed including risk of aspiration, recall and dental damage. All patient questions answered. Will continue with POC.)  intravenous induction     Anesthetic plan, all risks, benefits, and alternatives have been provided, discussed and informed consent has been obtained with: patient.        CODE STATUS:

## 2022-05-04 NOTE — ANESTHESIA PROCEDURE NOTES
Airway  Urgency: elective    Date/Time: 5/4/2022 10:24 AM  Airway not difficult    General Information and Staff    Patient location during procedure: OR  CRNA/CAA: Remy Ivy CRNA    Indications and Patient Condition  Indications for airway management: airway protection    Preoxygenated: yes  Mask difficulty assessment: 1 - vent by mask    Final Airway Details  Final airway type: endotracheal airway      Successful airway: ETT and LEONCIO tube  Cuffed: yes   Successful intubation technique: direct laryngoscopy  Endotracheal tube insertion site: oral  Blade: Khushbu  Blade size: 3  ETT size (mm): 7.0  Cormack-Lehane Classification: grade I - full view of glottis  Placement verified by: chest auscultation and capnometry   Measured from: lips  ETT/EBT  to lips (cm): 21  Number of attempts at approach: 1  Assessment: lips, teeth, and gum same as pre-op and atraumatic intubation

## 2022-05-11 ENCOUNTER — OFFICE VISIT (OUTPATIENT)
Dept: OTOLARYNGOLOGY | Facility: CLINIC | Age: 22
End: 2022-05-11

## 2022-05-11 VITALS — WEIGHT: 207 LBS | HEIGHT: 64 IN | BODY MASS INDEX: 35.34 KG/M2

## 2022-05-11 DIAGNOSIS — J34.3 NASAL TURBINATE HYPERTROPHY: ICD-10-CM

## 2022-05-11 DIAGNOSIS — J30.9 ALLERGIC RHINITIS, UNSPECIFIED SEASONALITY, UNSPECIFIED TRIGGER: Primary | ICD-10-CM

## 2022-05-11 DIAGNOSIS — J32.9 SINUSITIS, UNSPECIFIED CHRONICITY, UNSPECIFIED LOCATION: ICD-10-CM

## 2022-05-11 DIAGNOSIS — H69.83 DYSFUNCTION OF BOTH EUSTACHIAN TUBES: ICD-10-CM

## 2022-05-11 PROCEDURE — 31237 NSL/SINS NDSC SURG BX POLYPC: CPT | Performed by: OTOLARYNGOLOGY

## 2022-05-11 PROCEDURE — 99213 OFFICE O/P EST LOW 20 MIN: CPT | Performed by: OTOLARYNGOLOGY

## 2022-05-11 NOTE — PROGRESS NOTES
"       ENT Office Follow Up Note     Date of Consult: 2022     Patient Name: Marissa Skelton  MRN: [id number]   : 2000     Referring Provider: No ref. provider found    Care Team: Patient Care Team:  Ayana Tavares APRN as PCP - General (Family Medicine)     Chief Complaint:    Chief Complaint   Patient presents with   • Post-op       History of Present Illness: Marissa Skelton is a 22 y.o. female who presents to clinic today for follow up of surgery   No issues   Doing rinses   Minimal bleeding       Subjective      Review of Systems:   Review of Systems   HENT: Negative.       I have reviewed and confirmed the accuracy of the ROS as documented by the MA/LPN/RN Brian Kitchen MD     Pertinent items are noted in HPI.     The following portions of the patient's history were reviewed and updated as appropriate: allergies, current medications, past family history, past medical history, past social history, past surgical history and problem list.    Allergies:   Allergies   Allergen Reactions   • Latex Itching       Objective     Physical Exam:  Vital Signs:   Vitals:    22 1653   Weight: 93.9 kg (207 lb)   Height: 162.6 cm (64\")     Body mass index is 35.53 kg/m².     General Appearance:  healthy-appearing, well-nourished, and in no acute distress  Normal voice quality   Head:  Normocephalic, without obvious abnormality, atraumatic   Sinus: No maxillary tenderness   No frontal tenderness    Salivary Glands: No tenderness of the parotid glands or the submandibular glands and no parotid masses or submandibular masses  Normal saliva expressed from glands   Eyes:          Conjunctivae and sclerae normal, EOMI   Ear -  Left: EAC clear  TM WNL, no perfs, no effusion    Ear - Right:  EAC clear  TM retracted, no perfs, no effusion    Nose: Septum relatively straight, no lesions   S/p reduction   Crusting removed    Mouth: Lips WNL  MMM  No buccal lesions   Tongue, FOM WNL, no lesions, soft " to palpation  No palatal lesions   OP clear, no erythema or exudates   Neck: symmetrical and trachea midline  No thyroid nodules, no thyromegaly    Lungs:   No inc WOB    Heart: Regular  rate   Skin: Warm   Lymph nodes: No palpable cervical adenopathy   Neurologic: Cranial nerves 2 - 12 grossly intact     Alert and oriented   Appropriate mood and affect        Procedure:   Procedures  Nasal/Sinus Endoscopy with Debridement:    The AdNectarz O degree endoscope was utilized after instillation into the nares of 1% Lidocaine and 0.25% Phenylephrine bilaterally. Suction debridement was performed bilaterally. A  moderate amount of mucous and debris was removed from the nasal cavities. Erythema noted. The antrostomies were patent bilaterally. Expected post debridement oozing controlled d with afrin.  Expected edema with inflammation was noted throughout.    Assessment / Plan      Assessment/Plan:   Diagnoses and all orders for this visit:    1. Allergic rhinitis, unspecified seasonality, unspecified trigger (Primary)    2. Dysfunction of both eustachian tubes    3. Sinusitis, unspecified chronicity, unspecified location    4. Nasal turbinate hypertrophy     2/9/22 Visit:  Audiogram - mild high frequency conductive loss, though overall reassuring. Possible ETD contributing. Will recheck in about a year.   Allergy testing - positive, will try to get copy of test results, she notes javier grass, rec'd shots, she has not started   Sinus CT - reviewed, bilateral maxillary inflammation, obstructed [facility]'s    21-year-old follow-up on her allergies, eustachian tube dysfunction, and sinuses.  She states her sinuses have felt better but she still has popping and cracking in her ears.  Audio she had a mild conductive loss in the high frequencies on the left, possibly related to her eustachian tube dysfunction.  Otherwise reassuring exam today.  Plan to recheck her hearing in about a year.  On her sinus CT she does have significant  inflammation in her maxillary sinuses with obstructed OMCs.  This despite previous rounds of antibiotics and Flonase she is.  We discussed proceeding with bilateral maxillary enterostomies, bilateral anterior ethmoidectomies, bilateral inferior turbinate reduction, and bilateral eustachian tube balloon dilation.  She would like to go ahead and proceed with this.     5/4/22 - bilateral ET balloon, bilateral max and ant ethmoid, ITR  5/11/22 - POV1 debridement, finish course antibiotics, cont saline irrigations, f/u 1 month      Follow Up:   Return in about 1 month (around 6/11/2022).    Brian Kitchen MD

## 2022-07-11 ENCOUNTER — OFFICE VISIT (OUTPATIENT)
Dept: OTOLARYNGOLOGY | Facility: CLINIC | Age: 22
End: 2022-07-11

## 2022-07-11 VITALS
WEIGHT: 201 LBS | HEART RATE: 117 BPM | DIASTOLIC BLOOD PRESSURE: 80 MMHG | BODY MASS INDEX: 34.31 KG/M2 | SYSTOLIC BLOOD PRESSURE: 124 MMHG | OXYGEN SATURATION: 98 % | HEIGHT: 64 IN

## 2022-07-11 DIAGNOSIS — J30.9 ALLERGIC RHINITIS, UNSPECIFIED SEASONALITY, UNSPECIFIED TRIGGER: Primary | ICD-10-CM

## 2022-07-11 DIAGNOSIS — J32.9 SINUSITIS, UNSPECIFIED CHRONICITY, UNSPECIFIED LOCATION: ICD-10-CM

## 2022-07-11 DIAGNOSIS — J34.3 NASAL TURBINATE HYPERTROPHY: ICD-10-CM

## 2022-07-11 DIAGNOSIS — H69.83 DYSFUNCTION OF BOTH EUSTACHIAN TUBES: ICD-10-CM

## 2022-07-11 PROCEDURE — 31237 NSL/SINS NDSC SURG BX POLYPC: CPT | Performed by: OTOLARYNGOLOGY

## 2022-07-11 PROCEDURE — 99024 POSTOP FOLLOW-UP VISIT: CPT | Performed by: OTOLARYNGOLOGY

## 2022-07-11 NOTE — PROGRESS NOTES
"       ENT Office Follow Up Note     Date of Consult: 2022     Patient Name: Marissa Skelton  MRN: [id number]   : 2000     Referring Provider: No ref. provider found    Care Team: Patient Care Team:  Ayana Tavares APRN as PCP - General (Family Medicine)     Chief Complaint:    Chief Complaint   Patient presents with   • Follow-up     Allergic rhinitis, right ear pain       History of Present Illness: aMrissa Skelton is a 22 y.o. female who presents to clinic today for follow up of surgery   No issues with sinuses.    Has been doing saline irrigations daily   States about two weeks ago had pressure in right ear with breathing in and out. Bending head between legs helped. Has since settled down some.   Pressure/popping/cracking in ears much improved overall but still intermittently happening       Subjective      Review of Systems:   Review of Systems   HENT: Positive for ear pain. Negative for congestion, dental problem, drooling, ear discharge, facial swelling, hearing loss, mouth sores, nosebleeds, postnasal drip, rhinorrhea, sinus pressure, sneezing, sore throat, swollen glands, tinnitus, trouble swallowing and voice change.       I have reviewed and confirmed the accuracy of the ROS as documented by the MA/LPN/RN Brian Kitchen MD     Pertinent items are noted in HPI.     The following portions of the patient's history were reviewed and updated as appropriate: allergies, current medications, past family history, past medical history, past social history, past surgical history and problem list.    Allergies:   Allergies   Allergen Reactions   • Latex Itching       Objective     Physical Exam:  Vital Signs:   Vitals:    22 1533   BP: 124/80   Pulse: 117   SpO2: 98%   Weight: 91.2 kg (201 lb)   Height: 162.6 cm (64\")   PainSc: 0-No pain     Body mass index is 34.5 kg/m².     General Appearance:  healthy-appearing, well-nourished, and in no acute distress  Normal voice quality "   Head:  Normocephalic, without obvious abnormality, atraumatic   Sinus: No maxillary tenderness   No frontal tenderness    Salivary Glands: No tenderness of the parotid glands or the submandibular glands and no parotid masses or submandibular masses  Normal saliva expressed from glands   Eyes:          Conjunctivae and sclerae normal, EOMI   Ear -  Left: EAC clear  TM WNL, no perfs, no effusion    Ear - Right:  EAC clear  TM WNL except appears a bit thinner anteriorly though not quite monomeric, no perfs, no effusion    Nose: Septum relatively straight, no lesions   IT normal bilaterally   No mucosal inflammation or edema   No drainage    Mouth: Lips WNL  MMM  No buccal lesions   Tongue, FOM WNL, no lesions, soft to palpation  Tonsils normal   No palatal lesions   OP clear, no erythema or exudates   Neck: symmetrical and trachea midline  No thyroid nodules, no thyromegaly    Lungs:   No inc WOB    Heart: Regular  rate   Skin: Warm   Lymph nodes: No palpable cervical adenopathy   Neurologic: Cranial nerves 2 - 12 grossly intact     Alert and oriented   Appropriate mood and affect        Procedure:   Procedures  Procedure: Nasal Endoscopy    After patient written consent and topicalization, the endoscope was inserted atraumatically into the bilateral nasal cavities. Septum is midline . Turbinates are not significantly hypertrophied . Scant  Secretions. Patent max antrostomy bilaterally. NP and ET clear bilaterally.  No masses or concerning lesions visualized in the nasal cavities or nasopharynx. The patient tolerated the procedure well.        Assessment / Plan      Assessment/Plan:   Diagnoses and all orders for this visit:    1. Allergic rhinitis, unspecified seasonality, unspecified trigger (Primary)    2. Dysfunction of both eustachian tubes    3. Sinusitis, unspecified chronicity, unspecified location    4. Nasal turbinate hypertrophy       2/9/22 Visit:  Audiogram - mild high frequency conductive loss, though  overall reassuring. Possible ETD contributing. Will recheck in about a year.   Allergy testing - positive, will try to get copy of test results, she notes javier grass, jefe'geoff shots, she has not started   Sinus CT - reviewed, bilateral maxillary inflammation, obstructed OMC's    21-year-old follow-up on her allergies, eustachian tube dysfunction, and sinuses.  She states her sinuses have felt better but she still has popping and cracking in her ears.  Audio she had a mild conductive loss in the high frequencies on the left, possibly related to her eustachian tube dysfunction.  Otherwise reassuring exam today.  Plan to recheck her hearing in about a year.  On her sinus CT she does have significant inflammation in her maxillary sinuses with obstructed OMCs.  This despite previous rounds of antibiotics and Flonase she is.  We discussed proceeding with bilateral maxillary enterostomies, bilateral anterior ethmoidectomies, bilateral inferior turbinate reduction, and bilateral eustachian tube balloon dilation.  She would like to go ahead and proceed with this.      5/4/22 - bilateral ET balloon, bilateral max and ant ethmoid, ITR    5/11/22 - POV1 debridement, finish course antibiotics, cont saline irrigations, f/u 1 month    7/11/22 - Returns today for second postop.  Sinuses appear to have healed nicely.  She actually seems in her right ear to have swung from eustachian tube dysfunction to some intermittent patulous eustachian tube.  Things have seemed to settle down here in the last week or so though thankfully.  Given her sinuses have healed up nicely can stop the saline rinses as this may help cut down on that.  Discussed options further would be ear tubes but given overall her ears have improved significantly would likely give her the bit more time to see where she ultimately settles out after surgery.      Follow Up:   Return if symptoms worsen or fail to improve.    Brian Kitchen MD

## 2024-03-28 ENCOUNTER — OFFICE VISIT (OUTPATIENT)
Dept: GASTROENTEROLOGY | Facility: CLINIC | Age: 24
End: 2024-03-28
Payer: COMMERCIAL

## 2024-03-28 ENCOUNTER — LAB (OUTPATIENT)
Dept: LAB | Facility: HOSPITAL | Age: 24
End: 2024-03-28
Payer: COMMERCIAL

## 2024-03-28 VITALS
HEART RATE: 108 BPM | OXYGEN SATURATION: 98 % | BODY MASS INDEX: 36.56 KG/M2 | WEIGHT: 213 LBS | SYSTOLIC BLOOD PRESSURE: 128 MMHG | DIASTOLIC BLOOD PRESSURE: 88 MMHG

## 2024-03-28 DIAGNOSIS — K21.9 GASTROESOPHAGEAL REFLUX DISEASE, UNSPECIFIED WHETHER ESOPHAGITIS PRESENT: ICD-10-CM

## 2024-03-28 DIAGNOSIS — R19.7 DIARRHEA, UNSPECIFIED TYPE: ICD-10-CM

## 2024-03-28 DIAGNOSIS — R10.10 UPPER ABDOMINAL PAIN: ICD-10-CM

## 2024-03-28 DIAGNOSIS — R15.2 FECAL URGENCY: Primary | ICD-10-CM

## 2024-03-28 LAB
ALBUMIN SERPL-MCNC: 4.5 G/DL (ref 3.5–5.2)
ALBUMIN/GLOB SERPL: 1.6 G/DL
ALP SERPL-CCNC: 94 U/L (ref 39–117)
ALT SERPL W P-5'-P-CCNC: 19 U/L (ref 1–33)
ANION GAP SERPL CALCULATED.3IONS-SCNC: 12 MMOL/L (ref 5–15)
AST SERPL-CCNC: 19 U/L (ref 1–32)
BILIRUB SERPL-MCNC: 0.4 MG/DL (ref 0–1.2)
BUN SERPL-MCNC: 7 MG/DL (ref 6–20)
BUN/CREAT SERPL: 7 (ref 7–25)
CALCIUM SPEC-SCNC: 9.3 MG/DL (ref 8.6–10.5)
CHLORIDE SERPL-SCNC: 110 MMOL/L (ref 98–107)
CO2 SERPL-SCNC: 25 MMOL/L (ref 22–29)
CREAT SERPL-MCNC: 1 MG/DL (ref 0.57–1)
EGFRCR SERPLBLD CKD-EPI 2021: 80.8 ML/MIN/1.73
GLOBULIN UR ELPH-MCNC: 2.8 GM/DL
GLUCOSE SERPL-MCNC: 98 MG/DL (ref 65–99)
IGA1 MFR SER: 125 MG/DL (ref 70–400)
POTASSIUM SERPL-SCNC: 4.1 MMOL/L (ref 3.5–5.2)
PROT SERPL-MCNC: 7.3 G/DL (ref 6–8.5)
SODIUM SERPL-SCNC: 147 MMOL/L (ref 136–145)
T4 FREE SERPL-MCNC: 1.37 NG/DL (ref 0.93–1.7)
TSH SERPL DL<=0.05 MIU/L-ACNC: 1.24 UIU/ML (ref 0.27–4.2)

## 2024-03-28 PROCEDURE — 82784 ASSAY IGA/IGD/IGG/IGM EACH: CPT

## 2024-03-28 PROCEDURE — 80050 GENERAL HEALTH PANEL: CPT

## 2024-03-28 PROCEDURE — 86364 TISS TRNSGLTMNASE EA IG CLAS: CPT

## 2024-03-28 PROCEDURE — 84439 ASSAY OF FREE THYROXINE: CPT

## 2024-03-28 PROCEDURE — 36415 COLL VENOUS BLD VENIPUNCTURE: CPT

## 2024-03-28 RX ORDER — FAMOTIDINE 40 MG/1
40 TABLET, FILM COATED ORAL 2 TIMES DAILY PRN
Qty: 60 TABLET | Refills: 1 | Status: SHIPPED | OUTPATIENT
Start: 2024-03-28

## 2024-03-28 NOTE — PROGRESS NOTES
Follow Up Note     Date: 2024   Patient Name: Marissa Skelton  MRN: [id number]  : 2000     Primary Care Provider: Ayana Tavares APRN   Referring Provider: Mary Koheler MD    Chief Complaint   Patient presents with    Abdominal Pain    Constipation     History of present illness:   3/28/2024  Marissa Skelton is a 23 y.o. female who is here today as a consultation with gastroenterology for evaluation of abdominal Pain and Constipation.    She has had GI complaints for a while now.  Currently complains of fluctuation of stool consistencies from constipation to diarrhea.  She will have fecal urgency after eating frequently, this has occurred now for years.  Also complains of upper abdominal cramping.  Previous CT scan noted diverticular disease in the transverse colon.    Has acid reflux and burning all the way up her chest and into her stomach.  She has not take anything for heartburn for the past several years.  She took Protonix in the past.    She had a colonoscopy by Dr. Pearson in 2023, reports normal findings.  She had a EGD by Dr. Servin in , reports normal findings then other than reflux.    Subjective     Past Medical History:   Diagnosis Date    Allergic rhinitis     Mainly in summer.    Cancer     kidney    Cholelithiasis     Gallbladder had 0% function. Removed 2018.    COVID-19 vaccine series completed     WITH BOOSTER    GERD (gastroesophageal reflux disease) 2018    GERD. Diagnosed via EGD.    Irritable bowel syndrome     Suspected several years ago. Unsure it is on my charts.    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 Mild/3-5 times a year (adult).    Piercing     EARS    Seasonal allergies     Tinnitus     On and off for the last 10 years.    Wears glasses      Past Surgical History:   Procedure Laterality Date    ABDOMINAL SURGERY      Nephrectomy, .     ADENOIDECTOMY      CHOLECYSTECTOMY  03/14/2018    COLONOSCOPY  October 2023    Removal of one hemorrhoid. Normal.    ENDOSCOPY      HEMORRHOIDECTOMY  October 2023    One removed during colonoscopy.    MAXILLARY ANTROSTOMY Bilateral 05/04/2022    Procedure: FUNCTIONAL ENDOSCOPIC SINUS SURGERY WITH MAXILLARY ANTROSTOMY BILATERAL, BILATERAL ANTERIOR ETHMOIDECTOMIES, BILATERAL INFERIOR TURBINATE REDUCTION, BILATERAL EUSTACHIAN TUBE BALLOON DILATION;  Surgeon: Brian Kitchen MD;  Location: [facility];  Service: ENT;  Laterality: Bilateral;    NEPHRECTOMY Right     TONSILLECTOMY      UPPER GASTROINTESTINAL ENDOSCOPY  February 2018    Mild GERD. Normal.    WISDOM TOOTH EXTRACTION       Family History   Problem Relation Age of Onset    Migraines Mother     Cancer Father     Cancer Maternal Grandmother     Diabetes Maternal Grandmother     Irritable bowel syndrome Maternal Grandmother     Asthma Sister     Colon polyps Maternal Uncle      Social History     Socioeconomic History    Marital status: Single   Tobacco Use    Smoking status: Never    Smokeless tobacco: Never   Vaping Use    Vaping status: Never Used   Substance and Sexual Activity    Alcohol use: No    Drug use: No    Sexual activity: Not Currently     Partners: Male     Birth control/protection: Abstinence     Current Outpatient Medications:   NONE    Allergies   Allergen Reactions    Latex Itching     The following portions of the patient's history were reviewed and updated as appropriate: allergies, current medications, past family history, past medical history, past social history, past surgical history and problem list.    Objective     Physical Exam  Vitals reviewed.   Constitutional:       General: She is not in acute distress.     Appearance: Normal appearance. She is well-developed. She is not ill-appearing or diaphoretic.   HENT:      Head: Normocephalic and atraumatic.      Right Ear: External ear normal.      Left Ear: External ear normal.       Nose: Nose normal.   Eyes:      General: No scleral icterus.        Right eye: No discharge.         Left eye: No discharge.      Conjunctiva/sclera: Conjunctivae normal.   Neck:      Vascular: No JVD.   Cardiovascular:      Rate and Rhythm: Normal rate and regular rhythm.      Heart sounds: Normal heart sounds. No murmur heard.     No friction rub. No gallop.   Pulmonary:      Effort: Pulmonary effort is normal. No respiratory distress.      Breath sounds: Normal breath sounds. No wheezing or rales.   Chest:      Chest wall: No tenderness.   Abdominal:      General: Bowel sounds are normal. There is no distension.      Palpations: Abdomen is soft. There is no mass.      Tenderness: There is abdominal tenderness (epigastric, mild). There is no guarding.   Musculoskeletal:         General: No deformity. Normal range of motion.      Cervical back: Normal range of motion.   Skin:     General: Skin is warm and dry.      Findings: No erythema or rash.   Neurological:      Mental Status: She is alert and oriented to person, place, and time.      Coordination: Coordination normal.   Psychiatric:         Mood and Affect: Mood normal.         Behavior: Behavior normal.         Thought Content: Thought content normal.         Judgment: Judgment normal.       Vitals:    03/28/24 1445   BP: 128/88   Pulse: 108   SpO2: 98%   Weight: 96.6 kg (213 lb)     Results Review:   I reviewed the patient's new clinical results.    Lab on 03/28/2024   Component Date Value Ref Range Status    WBC 03/28/2024 6.48  3.40 - 10.80 10*3/mm3 Final    RBC 03/28/2024 4.99  3.77 - 5.28 10*6/mm3 Final    Hemoglobin 03/28/2024 12.4  12.0 - 15.9 g/dL Final    Hematocrit 03/28/2024 39.5  34.0 - 46.6 % Final    MCV 03/28/2024 79.2  79.0 - 97.0 fL Final    MCH 03/28/2024 24.8 (L)  26.6 - 33.0 pg Final    MCHC 03/28/2024 31.4 (L)  31.5 - 35.7 g/dL Final    RDW 03/28/2024 13.3  12.3 - 15.4 % Final    RDW-SD 03/28/2024 37.8  37.0 - 54.0 fl Final    MPV  03/28/2024 10.8  6.0 - 12.0 fL Final    Platelets 03/28/2024 279  140 - 450 10*3/mm3 Final    IgA 03/28/2024 125  70 - 400 mg/dL Final    Free T4 03/28/2024 1.37  0.93 - 1.70 ng/dL Final    TSH 03/28/2024 1.240  0.270 - 4.200 uIU/mL Final    Glucose 03/28/2024 98  65 - 99 mg/dL Final    BUN 03/28/2024 7  6 - 20 mg/dL Final    Creatinine 03/28/2024 1.00  0.57 - 1.00 mg/dL Final    Sodium 03/28/2024 147 (H)  136 - 145 mmol/L Final    Potassium 03/28/2024 4.1  3.5 - 5.2 mmol/L Final    Chloride 03/28/2024 110 (H)  98 - 107 mmol/L Final    CO2 03/28/2024 25.0  22.0 - 29.0 mmol/L Final    Calcium 03/28/2024 9.3  8.6 - 10.5 mg/dL Final    Total Protein 03/28/2024 7.3  6.0 - 8.5 g/dL Final    Albumin 03/28/2024 4.5  3.5 - 5.2 g/dL Final    ALT (SGPT) 03/28/2024 19  1 - 33 U/L Final    AST (SGOT) 03/28/2024 19  1 - 32 U/L Final    Alkaline Phosphatase 03/28/2024 94  39 - 117 U/L Final    Total Bilirubin 03/28/2024 0.4  0.0 - 1.2 mg/dL Final    Globulin 03/28/2024 2.8  gm/dL Final    A/G Ratio 03/28/2024 1.6  g/dL Final    BUN/Creatinine Ratio 03/28/2024 7.0  7.0 - 25.0 Final    Anion Gap 03/28/2024 12.0  5.0 - 15.0 mmol/L Final    eGFR 03/28/2024 80.8  >60.0 mL/min/1.73 Final      CTAP without contrast 4-2019  FINDINGS:   ABDOMEN: The lung bases are clear. The heart size is normal. The limited  noncontrast images of the liver are normal. The patient is status post  cholecystectomy. The spleen is normal. No adrenal masses are seen.  The  pancreas has an unremarkable unenhanced appearance.. The aorta is normal  in caliber. There is no significant free fluid or adenopathy. The right  kidney is surgically absent. There is no nephrolithiasis. There is no  hydronephrosis. Limited noncontrast images of the bowel are  unremarkable.  PELVIS: The appendix is mildly dilated, however no inflammatory  stranding is seen adjacent to the appendix. There are colonic  diverticula involving the transverse colon and splenic flexure  without  evidence of diverticulitis. The urinary bladder is unremarkable. There  is no significant fluid or adenopathy.  IMPRESSION:  1. Mildly dilated appendix with no adjacent inflammatory stranding which  is likely an incidental finding.  2. Colonic diverticula predominantly involving the transverse colon with  no evidence of diverticulitis.    Assessment / Plan      1. Fecal urgency  2. Diarrhea, unspecified type  3. Upper abdominal pain  She has a long history of GI complaints.  Recently with worsening fluctuation of stool consistencies from constipation to diarrhea.  She mostly has loose stools.  She will have a urgency to have a bowel movement after eating often.  Also with upper abdominal cramping and discomfort.  On previous CT scan in 2019 she did have diverticulosis of the transverse colon noted.  Basic labs in March 2024 unremarkable.  Colonoscopy last in 2023 by Dr. David, complete details unknown. Based on history and previous findings, suspect she has underlying functional GI disorder, others to rule out.    Will request previous EGD report from Dr. Servin's office  Will request previous colonoscopy report from Dr. David office  Labs today  Stool testing  Consider repeat CT scan of the abdomen pelvis with IV contrast if normal results  Low FODMAP diet    - IgA; Future  - Tissue Transglutaminase, IgA; Future  - T4, Free; Future  - TSH; Future  - CBC (No Diff); Future    - Pancreatic Elastase, Fecal - Stool, Per Rectum; Future  - Gastrointestinal Panel, PCR - Stool, Per Rectum; Future  - Calprotectin, Fecal - Stool, Per Rectum; Future  - Clostridioides difficile Toxin, PCR - Stool, Per Rectum; Future  - Comprehensive Metabolic Panel; Future    4. Gastroesophageal reflux disease, unspecified whether esophagitis present  She has had acid reflux for many years.  Previously took Protonix.  She had a prior EGD in 2018 by Dr. Servin.  Still with acid reflux symptoms frequently.  Complains of reflux up  into her chest.    Start Pepcid 40 mg twice daily as needed heartburn  Acid reflux measures   Avoid NSAIDs  Repeat EGD to be considered next visit    - famotidine (Pepcid) 40 MG tablet; Take 1 tablet by mouth 2 (Two) Times a Day As Needed for Heartburn.  Dispense: 60 tablet; Refill: 1    Follow Up:   Return in about 3 months (around 6/28/2024) for recheck abdominal pain, discussion of results.    Rupa Echeverria PA-C  [facility]  3/28/2024  17:57 EDT    Dictated Utilizing Dragon Dictation: Part of this note may be an electronic transcription/translation of spoken language to printed text using the Dragon Dictation System.

## 2024-03-29 LAB
DEPRECATED RDW RBC AUTO: 37.8 FL (ref 37–54)
ERYTHROCYTE [DISTWIDTH] IN BLOOD BY AUTOMATED COUNT: 13.3 % (ref 12.3–15.4)
HCT VFR BLD AUTO: 39.5 % (ref 34–46.6)
HGB BLD-MCNC: 12.4 G/DL (ref 12–15.9)
MCH RBC QN AUTO: 24.8 PG (ref 26.6–33)
MCHC RBC AUTO-ENTMCNC: 31.4 G/DL (ref 31.5–35.7)
MCV RBC AUTO: 79.2 FL (ref 79–97)
PLATELET # BLD AUTO: 279 10*3/MM3 (ref 140–450)
PMV BLD AUTO: 10.8 FL (ref 6–12)
RBC # BLD AUTO: 4.99 10*6/MM3 (ref 3.77–5.28)
WBC NRBC COR # BLD AUTO: 6.48 10*3/MM3 (ref 3.4–10.8)

## 2024-03-30 LAB — TTG IGA SER-ACNC: <2 U/ML (ref 0–3)

## (undated) DEVICE — GLV SURG SENSICARE W/ALOE PF LF 6.5 STRL

## (undated) DEVICE — BALN DIL SINUS XPRESS LP W/LT FIBR 6X18MM

## (undated) DEVICE — TBG PENCL TELESCP MEGADYNE SMOKE EVAC 10FT

## (undated) DEVICE — SYR LL 3CC

## (undated) DEVICE — SUT GUT CHRM 4/0 P3 18IN 1654G

## (undated) DEVICE — SUT GUT PLN 4/0 SC1 18IN 1824H

## (undated) DEVICE — NEURO SPONGES: Brand: DEROYAL

## (undated) DEVICE — Device

## (undated) DEVICE — SALEM SUMP DUAL LUMEN STOMACH TUBE WITH ENFIT CONNECTION: Brand: SALEM SUMP

## (undated) DEVICE — BLADE 1884012HR RAD12 5PK M4 4MM ROTATE: Brand: RAD

## (undated) DEVICE — NDL HYPO ECLPS SFTY 18G 1 1/2IN

## (undated) DEVICE — SHEATH 1912000 5PK 4MM/0DEG STORZ XOMED: Brand: ENDO-SCRUB®

## (undated) DEVICE — SUT PROLN 2/0 CT2 30IN 8411H

## (undated) DEVICE — SPLINT 1524055 DOYLE II AIRWAY SET: Brand: DOYLE II ™

## (undated) DEVICE — 4-PORT MANIFOLD: Brand: NEPTUNE 2

## (undated) DEVICE — KT ANTI FOG W/FLD AND SPNG

## (undated) DEVICE — TUBING 1895522 5PK STRAIGHTSHOT TO XPS: Brand: STRAIGHTSHOT®

## (undated) DEVICE — SYR LUERLOK 30CC

## (undated) DEVICE — TUBING, SUCTION, 1/4" X 12', STRAIGHT: Brand: MEDLINE

## (undated) DEVICE — NASAL EPISTAXIS 2 PACK: Brand: XEROGEL

## (undated) DEVICE — PK MAJ ENT 10